# Patient Record
Sex: FEMALE | Race: WHITE | NOT HISPANIC OR LATINO
[De-identification: names, ages, dates, MRNs, and addresses within clinical notes are randomized per-mention and may not be internally consistent; named-entity substitution may affect disease eponyms.]

---

## 2024-08-09 ENCOUNTER — RX ONLY (OUTPATIENT)
Age: 44
Setting detail: RX ONLY
End: 2024-08-09

## 2024-08-09 ENCOUNTER — APPOINTMENT (RX ONLY)
Dept: URBAN - METROPOLITAN AREA CLINIC 55 | Facility: CLINIC | Age: 44
Setting detail: DERMATOLOGY
End: 2024-08-09

## 2024-08-09 DIAGNOSIS — Z41.9 ENCOUNTER FOR PROCEDURE FOR PURPOSES OTHER THAN REMEDYING HEALTH STATE, UNSPECIFIED: ICD-10-CM

## 2024-08-09 PROCEDURE — ? COSMETIC CONSULTATION: GENERAL

## 2024-08-09 PROCEDURE — ? INVENTORY

## 2024-08-23 ENCOUNTER — APPOINTMENT (RX ONLY)
Dept: URBAN - METROPOLITAN AREA CLINIC 55 | Facility: CLINIC | Age: 44
Setting detail: DERMATOLOGY
End: 2024-08-23

## 2024-08-23 DIAGNOSIS — Z41.9 ENCOUNTER FOR PROCEDURE FOR PURPOSES OTHER THAN REMEDYING HEALTH STATE, UNSPECIFIED: ICD-10-CM

## 2024-08-23 PROCEDURE — ? MICRONEEDLING

## 2024-08-23 PROCEDURE — ? INVENTORY

## 2024-08-23 NOTE — PROCEDURE: MICRONEEDLING
Depth In Mm (Location #2): 0.1
Post-Care Instructions: Alastin Skin Nectar and tinted SPF applied immediately post treatment.\\nAfter the procedure, take precautions against sun exposure. Avoid applying make-up for 24 hours post procedure. Cleanse with gentle cleanser. After 2-3 days, the patient may return to their regular skin regimen.
Detail Level: Zone
Length Of Topical Anesthesia Application (Optional): 60 minutes
Topical Anesthesia?: 20% benzocaine, 8% lidocaine, 4% tetracaine
Consent obtained. Risks reviewed.
How Many Cc Of Bacteriostatic 0.9% Saline Were Added?: 0
Treatment Number (Optional): 1

## 2024-09-20 ENCOUNTER — APPOINTMENT (RX ONLY)
Dept: URBAN - METROPOLITAN AREA CLINIC 55 | Facility: CLINIC | Age: 44
Setting detail: DERMATOLOGY
End: 2024-09-20

## 2024-09-20 DIAGNOSIS — Z41.9 ENCOUNTER FOR PROCEDURE FOR PURPOSES OTHER THAN REMEDYING HEALTH STATE, UNSPECIFIED: ICD-10-CM

## 2024-09-20 PROCEDURE — ? MICRONEEDLING

## 2024-09-20 PROCEDURE — ? INVENTORY

## 2024-09-20 NOTE — PROCEDURE: MICRONEEDLING
Treatment Number (Optional): 2
Depth In Mm (Location #3): 0.1
How Many Cc Of Bacteriostatic 0.9% Saline Were Added?: 0
Post-Care Instructions: Alastin Skin Nectar and tinted SPF applied immediately post treatment.\\nAfter the procedure, take precautions against sun exposure. Avoid applying make-up for 24 hours post procedure. Cleanse with gentle cleanser. After 2-3 days, the patient may return to their regular skin regimen.
Consent obtained. Risks reviewed.
Length Of Topical Anesthesia Application (Optional): 60 minutes
Detail Level: Zone
Topical Anesthesia?: 20% benzocaine, 8% lidocaine, 4% tetracaine

## 2024-10-24 ENCOUNTER — APPOINTMENT (RX ONLY)
Dept: URBAN - METROPOLITAN AREA CLINIC 55 | Facility: CLINIC | Age: 44
Setting detail: DERMATOLOGY
End: 2024-10-24

## 2024-10-24 DIAGNOSIS — Z41.9 ENCOUNTER FOR PROCEDURE FOR PURPOSES OTHER THAN REMEDYING HEALTH STATE, UNSPECIFIED: ICD-10-CM

## 2024-10-24 PROCEDURE — ? MICRONEEDLING

## 2024-10-24 PROCEDURE — ? INVENTORY

## 2024-10-24 NOTE — PROCEDURE: MICRONEEDLING
# Of Treatments In Package: 0
Length Of Topical Anesthesia Application (Optional): 60 minutes
Depth In Mm (Location #3): 0.1
Detail Level: Zone
Topical Anesthesia?: 20% benzocaine, 8% lidocaine, 4% tetracaine
Post-Care Instructions: Alastin Skin Nectar and tinted SPF applied immediately post treatment.\\nAfter the procedure, take precautions against sun exposure. Avoid applying make-up for 24 hours post procedure. Cleanse with gentle cleanser. After 2-3 days, the patient may return to their regular skin regimen.
Treatment Number (Optional): 3
Consent obtained. Risks reviewed.
Inflammation Suggestive Of Cancer Camouflage Histology Text: There was a dense lymphocytic infiltrate which prevented adequate histologic evaluation of adjacent structures.

## 2024-12-10 ENCOUNTER — APPOINTMENT (OUTPATIENT)
Dept: URBAN - METROPOLITAN AREA CLINIC 55 | Facility: CLINIC | Age: 44
Setting detail: DERMATOLOGY
End: 2024-12-10

## 2024-12-10 DIAGNOSIS — Z41.9 ENCOUNTER FOR PROCEDURE FOR PURPOSES OTHER THAN REMEDYING HEALTH STATE, UNSPECIFIED: ICD-10-CM

## 2024-12-10 PROCEDURE — ? COSMETIC CONSULTATION: GENERAL

## 2024-12-10 PROCEDURE — ? INVENTORY

## 2024-12-13 ENCOUNTER — APPOINTMENT (OUTPATIENT)
Dept: URBAN - METROPOLITAN AREA CLINIC 55 | Facility: CLINIC | Age: 44
Setting detail: DERMATOLOGY
End: 2024-12-13

## 2024-12-13 DIAGNOSIS — Z41.9 ENCOUNTER FOR PROCEDURE FOR PURPOSES OTHER THAN REMEDYING HEALTH STATE, UNSPECIFIED: ICD-10-CM

## 2024-12-13 PROCEDURE — ? SCULPTRA

## 2024-12-13 NOTE — PROCEDURE: SCULPTRA
Detail Level: Detailed
Show Fifth Additional Location?: Yes
Jawline Sculptra Filler Volume In Cc: 0
Show Right And Left Temple Volume?: No
Additional Anesthesia Volume In Cc: 6
Vials Reconstituted (Required For Inventory): 2
Post-Care Instructions: Aftercare instructions were provided to the patient.
Show Inventory Tab: Show
Anesthesia Type: 1% lidocaine with epinephrine
Dilution Method: The Sculptra was reconstituted with 8cc of sterile water and 2cc 2% plain lidocaine for each vial for a total volume of 10 cc for each vial.
Anesthesia Volume In Cc: 0.5
Injection Technique: The Sculptra was injected to the areas shown with a 25g cannula after prepping the skin with alcohol and/or chlorhexidine and providing appropriate anesthesia.
Consent obtained.
Map Statement: See Attached Map for Complete Details.